# Patient Record
Sex: MALE | Race: WHITE | NOT HISPANIC OR LATINO | Employment: FULL TIME | URBAN - NONMETROPOLITAN AREA
[De-identification: names, ages, dates, MRNs, and addresses within clinical notes are randomized per-mention and may not be internally consistent; named-entity substitution may affect disease eponyms.]

---

## 2021-10-28 ENCOUNTER — HISTORICAL (OUTPATIENT)
Dept: ADMINISTRATIVE | Facility: HOSPITAL | Age: 46
End: 2021-10-28

## 2021-10-28 LAB
ALBUMIN SERPL-MCNC: 4.6 G/DL (ref 3.4–5)
ALBUMIN/GLOB SERPL: 1.6 {RATIO}
ALP SERPL-CCNC: 89 U/L (ref 50–144)
ALT SERPL-CCNC: 16 U/L (ref 1–45)
ANION GAP SERPL CALC-SCNC: 5 MMOL/L (ref 7–16)
AST SERPL-CCNC: 25 U/L (ref 17–59)
BILIRUB SERPL-MCNC: 0.88 MG/DL (ref 0–1)
BUN SERPL-MCNC: 11 MG/DL (ref 7–20)
CALCIUM SERPL-MCNC: 9.5 MG/DL (ref 8.4–10.2)
CHLORIDE SERPL-SCNC: 103 MMOL/L (ref 94–110)
CO2 SERPL-SCNC: 27 MMOL/L (ref 21–32)
CREAT SERPL-MCNC: 0.7 MG/DL (ref 0.66–1.25)
CREAT/UREA NIT SERPL: 15.7 (ref 12–20)
GLOBULIN SER-MCNC: 2.8 G/DL (ref 2–3.9)
GLUCOSE SERPL-MCNC: 127 MGM./DL (ref 70–115)
POTASSIUM SERPL-SCNC: 3.9 MMOL/L (ref 3.5–5.1)
PROT SERPL-MCNC: 7.4 G/DL (ref 6.3–8.2)
SODIUM SERPL-SCNC: 135 MMOL/L (ref 135–145)

## 2021-10-30 LAB
BASOPHILS # BLD AUTO: 0.04 10*3/UL (ref 0.01–0.08)
BASOPHILS NFR BLD AUTO: 0.4 % (ref 0.1–1.2)
EOSINOPHIL # BLD AUTO: 0.13 10*3/UL (ref 0.04–0.54)
EOSINOPHIL NFR BLD AUTO: 1.2 % (ref 0.7–7)
ERYTHROCYTE [DISTWIDTH] IN BLOOD BY AUTOMATED COUNT: 12.4 % (ref 11.6–14.4)
HCT VFR BLD AUTO: 34.9 % (ref 36–52)
HGB BLD-MCNC: 11.6 G/DL (ref 13–18)
IMM GRANULOCYTES # BLD AUTO: 0.03 10*3/UL (ref 0–0.03)
IMM GRANULOCYTES NFR BLD AUTO: 0.3 % (ref 0–0.5)
LYMPHOCYTES # BLD AUTO: 1.44 10*3/UL (ref 1.32–3.57)
LYMPHOCYTES NFR BLD AUTO: 13.8 % (ref 20–55)
MCH RBC QN AUTO: 30.7 PG (ref 27–34)
MCHC RBC AUTO-ENTMCNC: 33.2 G/DL (ref 31–37)
MCV RBC AUTO: 92.3 FL (ref 79–99)
MONOCYTES # BLD AUTO: 0.89 10*3/UL (ref 0.3–0.82)
MONOCYTES NFR BLD AUTO: 8.5 % (ref 4.7–12.5)
NEUTROPHILS # BLD AUTO: 7.9 10*3/UL (ref 1.78–5.38)
NEUTROPHILS NFR BLD AUTO: 75.8 % (ref 37–73)
PLATELET # BLD AUTO: 284 10*3/UL (ref 140–371)
PMV BLD AUTO: 10.2 FL (ref 9.4–12.4)
RBC # BLD AUTO: 3.78 10*6/UL (ref 4–6)
WBC # SPEC AUTO: 10.4 10*3/UL (ref 4–11.5)

## 2021-11-19 LAB
ALBUMIN SERPL-MCNC: 4.2 G/DL (ref 3.4–5)
ALBUMIN/GLOB SERPL: 1.5 {RATIO}
ALP SERPL-CCNC: 74 U/L (ref 50–144)
ALT SERPL-CCNC: 20 U/L (ref 1–45)
ANION GAP SERPL CALC-SCNC: 5 MMOL/L (ref 7–16)
AST SERPL-CCNC: 24 U/L (ref 17–59)
BASOPHILS # BLD AUTO: 0.04 10*3/UL (ref 0.01–0.08)
BASOPHILS NFR BLD AUTO: 0.7 % (ref 0.1–1.2)
BILIRUB SERPL-MCNC: 0.49 MG/DL (ref 0–1)
BUN SERPL-MCNC: 20 MG/DL (ref 7–20)
CALCIUM SERPL-MCNC: 9.3 MG/DL (ref 8.4–10.2)
CHLORIDE SERPL-SCNC: 105 MMOL/L (ref 94–110)
CO2 SERPL-SCNC: 29 MMOL/L (ref 21–32)
CREAT SERPL-MCNC: 1 MG/DL (ref 0.66–1.25)
CREAT/UREA NIT SERPL: 20 (ref 12–20)
CRP SERPL-MCNC: <0.5 MG/DL (CALC) (ref 0–0.9)
EOSINOPHIL # BLD AUTO: 0.2 10*3/UL (ref 0.04–0.54)
EOSINOPHIL NFR BLD AUTO: 3.7 % (ref 0.7–7)
ERYTHROCYTE [DISTWIDTH] IN BLOOD BY AUTOMATED COUNT: 12.6 % (ref 11.6–14.4)
ERYTHROCYTE [SEDIMENTATION RATE] IN BLOOD: 12 MM/HR (ref 0–10)
GLOBULIN SER-MCNC: 2.8 G/DL (ref 2–3.9)
GLUCOSE SERPL-MCNC: 70 MGM./DL (ref 70–115)
HCT VFR BLD AUTO: 38.3 % (ref 36–52)
HGB BLD-MCNC: 12.6 G/DL (ref 13–18)
IMM GRANULOCYTES # BLD AUTO: 0.01 10*3/UL (ref 0–0.03)
IMM GRANULOCYTES NFR BLD AUTO: 0.2 % (ref 0–0.5)
LYMPHOCYTES # BLD AUTO: 1.33 10*3/UL (ref 1.32–3.57)
LYMPHOCYTES NFR BLD AUTO: 24.6 % (ref 20–55)
MCH RBC QN AUTO: 30.1 PG (ref 27–34)
MCHC RBC AUTO-ENTMCNC: 32.9 G/DL (ref 31–37)
MCV RBC AUTO: 91.6 FL (ref 79–99)
MONOCYTES # BLD AUTO: 0.37 10*3/UL (ref 0.3–0.82)
MONOCYTES NFR BLD AUTO: 6.8 % (ref 4.7–12.5)
NEUTROPHILS # BLD AUTO: 3.46 10*3/UL (ref 1.78–5.38)
NEUTROPHILS NFR BLD AUTO: 64 % (ref 37–73)
PLATELET # BLD AUTO: 413 10*3/UL (ref 140–371)
PMV BLD AUTO: 10.8 FL (ref 9.4–12.4)
POTASSIUM SERPL-SCNC: 4.2 MMOL/L (ref 3.5–5.1)
PROT SERPL-MCNC: 7 G/DL (ref 6.3–8.2)
RBC # BLD AUTO: 4.18 10*6/UL (ref 4–6)
SODIUM SERPL-SCNC: 139 MMOL/L (ref 135–145)
WBC # SPEC AUTO: 5.4 10*3/UL (ref 4–11.5)

## 2021-12-17 ENCOUNTER — HISTORICAL (OUTPATIENT)
Dept: ADMINISTRATIVE | Facility: HOSPITAL | Age: 46
End: 2021-12-17

## 2021-12-17 LAB
BASOPHILS # BLD AUTO: 0.03 10*3/UL (ref 0.01–0.08)
BASOPHILS NFR BLD AUTO: 0.4 % (ref 0.1–1.2)
CRP SERPL-MCNC: <0.5 MG/DL (CALC) (ref 0–0.9)
EOSINOPHIL # BLD AUTO: 0.12 10*3/UL (ref 0.04–0.54)
EOSINOPHIL NFR BLD AUTO: 1.5 % (ref 0.7–7)
ERYTHROCYTE [DISTWIDTH] IN BLOOD BY AUTOMATED COUNT: 13 % (ref 11.6–14.4)
ERYTHROCYTE [SEDIMENTATION RATE] IN BLOOD: 2 MM/HR (ref 0–10)
HCT VFR BLD AUTO: 39.1 % (ref 36–52)
HGB BLD-MCNC: 12.8 G/DL (ref 13–18)
IMM GRANULOCYTES # BLD AUTO: 0.04 10*3/UL (ref 0–0.03)
IMM GRANULOCYTES NFR BLD AUTO: 0.5 % (ref 0–0.5)
LYMPHOCYTES # BLD AUTO: 1.93 10*3/UL (ref 1.32–3.57)
LYMPHOCYTES NFR BLD AUTO: 24.4 % (ref 20–55)
MCH RBC QN AUTO: 30.1 PG (ref 27–34)
MCHC RBC AUTO-ENTMCNC: 32.7 G/DL (ref 31–37)
MCV RBC AUTO: 92 FL (ref 79–99)
MONOCYTES # BLD AUTO: 0.57 10*3/UL (ref 0.3–0.82)
MONOCYTES NFR BLD AUTO: 7.2 % (ref 4.7–12.5)
NEUTROPHILS # BLD AUTO: 5.23 10*3/UL (ref 1.78–5.38)
NEUTROPHILS NFR BLD AUTO: 66 % (ref 37–73)
PLATELET # BLD AUTO: 332 10*3/UL (ref 140–371)
PMV BLD AUTO: 10.6 FL (ref 9.4–12.4)
RBC # BLD AUTO: 4.25 10*6/UL (ref 4–6)
WBC # SPEC AUTO: 7.9 10*3/UL (ref 4–11.5)

## 2022-04-10 ENCOUNTER — HISTORICAL (OUTPATIENT)
Dept: ADMINISTRATIVE | Facility: HOSPITAL | Age: 47
End: 2022-04-10

## 2022-04-11 ENCOUNTER — HISTORICAL (OUTPATIENT)
Dept: ADMINISTRATIVE | Facility: HOSPITAL | Age: 47
End: 2022-04-11

## 2022-04-28 VITALS
BODY MASS INDEX: 23.01 KG/M2 | DIASTOLIC BLOOD PRESSURE: 58 MMHG | SYSTOLIC BLOOD PRESSURE: 105 MMHG | HEIGHT: 67 IN | WEIGHT: 146.63 LBS | OXYGEN SATURATION: 98 %

## 2022-04-28 VITALS
BODY MASS INDEX: 23.01 KG/M2 | OXYGEN SATURATION: 98 % | WEIGHT: 146.63 LBS | SYSTOLIC BLOOD PRESSURE: 105 MMHG | HEIGHT: 67 IN | DIASTOLIC BLOOD PRESSURE: 58 MMHG

## 2022-05-04 ENCOUNTER — HISTORICAL (OUTPATIENT)
Dept: ADMINISTRATIVE | Facility: HOSPITAL | Age: 47
End: 2022-05-04

## 2023-02-08 ENCOUNTER — LAB VISIT (OUTPATIENT)
Dept: LAB | Facility: HOSPITAL | Age: 48
End: 2023-02-08
Attending: NURSE PRACTITIONER
Payer: MEDICAID

## 2023-02-08 DIAGNOSIS — Z79.899 ENCOUNTER FOR LONG-TERM (CURRENT) USE OF OTHER MEDICATIONS: Primary | ICD-10-CM

## 2023-02-08 LAB
ALBUMIN SERPL-MCNC: 4.5 G/DL (ref 3.4–5)
ALBUMIN/GLOB SERPL: 1.8 RATIO
ALP SERPL-CCNC: 75 UNIT/L (ref 50–144)
ALT SERPL-CCNC: 18 UNIT/L (ref 1–45)
ANION GAP SERPL CALC-SCNC: 6 MEQ/L (ref 2–13)
AST SERPL-CCNC: 27 UNIT/L (ref 17–59)
BASOPHILS # BLD AUTO: 0.03 X10(3)/MCL (ref 0.01–0.08)
BASOPHILS NFR BLD AUTO: 0.4 % (ref 0.1–1.2)
BILIRUBIN DIRECT+TOT PNL SERPL-MCNC: 0.6 MG/DL (ref 0–1)
BUN SERPL-MCNC: 21 MG/DL (ref 7–20)
CALCIUM SERPL-MCNC: 9.6 MG/DL (ref 8.4–10.2)
CHLORIDE SERPL-SCNC: 103 MMOL/L (ref 98–110)
CHOLEST SERPL-MCNC: 189 MG/DL (ref 0–200)
CO2 SERPL-SCNC: 30 MMOL/L (ref 21–32)
CREAT SERPL-MCNC: 0.83 MG/DL (ref 0.66–1.25)
CREAT/UREA NIT SERPL: 25 (ref 12–20)
DEPRECATED CALCIDIOL+CALCIFEROL SERPL-MC: 28.1 NG/ML (ref 30–100)
EOSINOPHIL # BLD AUTO: 0.2 X10(3)/MCL (ref 0.04–0.54)
EOSINOPHIL NFR BLD AUTO: 2.5 % (ref 0.7–7)
ERYTHROCYTE [DISTWIDTH] IN BLOOD BY AUTOMATED COUNT: 12.3 % (ref 11.6–14.4)
GFR SERPLBLD CREATININE-BSD FMLA CKD-EPI: >90 MLS/MIN/1.73/M2
GLOBULIN SER-MCNC: 2.5 GM/DL (ref 2–3.9)
GLUCOSE SERPL-MCNC: 109 MG/DL (ref 70–115)
HCT VFR BLD AUTO: 40.7 % (ref 36–52)
HDLC SERPL-MCNC: 53 MG/DL (ref 40–60)
HGB BLD-MCNC: 13.8 GM/DL (ref 13–18)
IMM GRANULOCYTES # BLD AUTO: 0.04 X10(3)/MCL (ref 0–0.03)
IMM GRANULOCYTES NFR BLD AUTO: 0.5 % (ref 0–0.5)
LDLC SERPL DIRECT ASSAY-SCNC: 105.1 MG/DL (ref 30–100)
LYMPHOCYTES # BLD AUTO: 1.83 X10(3)/MCL (ref 1.32–3.57)
LYMPHOCYTES NFR BLD AUTO: 22.6 % (ref 20–55)
MAGNESIUM SERPL-MCNC: 2.1 MG/DL (ref 1.8–2.4)
MCH RBC QN AUTO: 31.7 PG (ref 27–34)
MCV RBC AUTO: 93.6 FL (ref 79–99)
MEAN CELL HEMOGLOBIN CONCENTRATION (OHS) G/DL: 33.9 G/DL (ref 31–37)
MONOCYTES # BLD AUTO: 0.5 X10(3)/MCL (ref 0.3–0.82)
MONOCYTES NFR BLD AUTO: 6.2 % (ref 4.7–12.5)
NEUTROPHILS # BLD AUTO: 5.49 X10(3)/MCL (ref 1.78–5.38)
NEUTROPHILS NFR BLD AUTO: 67.8 % (ref 37–73)
NRBC BLD AUTO-RTO: 0 % (ref 0–1)
PLATELET # BLD AUTO: 276 X10(3)/MCL (ref 140–371)
PMV BLD AUTO: 10.3 FL (ref 9.4–12.4)
POTASSIUM SERPL-SCNC: 4.3 MMOL/L (ref 3.5–5.1)
PROT SERPL-MCNC: 7 GM/DL (ref 6.3–8.2)
RBC # BLD AUTO: 4.35 X10(6)/MCL (ref 4–6)
SODIUM SERPL-SCNC: 139 MMOL/L (ref 135–145)
TRIGL SERPL-MCNC: 126 MG/DL (ref 30–200)
TSH SERPL-ACNC: 2.09 UIU/ML (ref 0.36–3.74)
WBC # SPEC AUTO: 8.1 X10(3)/MCL (ref 4–11.5)

## 2023-02-08 PROCEDURE — 85025 COMPLETE CBC W/AUTO DIFF WBC: CPT

## 2023-02-08 PROCEDURE — 36415 COLL VENOUS BLD VENIPUNCTURE: CPT

## 2023-02-08 PROCEDURE — 83735 ASSAY OF MAGNESIUM: CPT

## 2023-02-08 PROCEDURE — 80053 COMPREHEN METABOLIC PANEL: CPT

## 2023-02-08 PROCEDURE — 82306 VITAMIN D 25 HYDROXY: CPT

## 2023-02-08 PROCEDURE — 84443 ASSAY THYROID STIM HORMONE: CPT

## 2023-02-08 PROCEDURE — 80061 LIPID PANEL: CPT

## 2023-02-08 PROCEDURE — 84146 ASSAY OF PROLACTIN: CPT

## 2023-02-09 LAB — PROLACTIN LEVEL (OHS): 45.3 NG/ML (ref 3.46–19.4)

## 2023-12-05 ENCOUNTER — HOSPITAL ENCOUNTER (EMERGENCY)
Facility: HOSPITAL | Age: 48
Discharge: HOME | End: 2023-12-05
Payer: MEDICAID

## 2023-12-05 VITALS
RESPIRATION RATE: 18 BRPM | TEMPERATURE: 98 F | SYSTOLIC BLOOD PRESSURE: 107 MMHG | HEIGHT: 67 IN | DIASTOLIC BLOOD PRESSURE: 71 MMHG | OXYGEN SATURATION: 98 % | HEART RATE: 75 BPM | BODY MASS INDEX: 27.47 KG/M2 | WEIGHT: 175 LBS

## 2023-12-05 DIAGNOSIS — L60.0 INGROWN RIGHT BIG TOENAIL: Primary | ICD-10-CM

## 2023-12-05 PROCEDURE — 99282 EMERGENCY DEPT VISIT SF MDM: CPT

## 2023-12-05 RX ORDER — CLOTRIMAZOLE 1 %
CREAM (GRAM) TOPICAL 2 TIMES DAILY
Qty: 28 G | Refills: 0 | Status: SHIPPED | OUTPATIENT
Start: 2023-12-05 | End: 2023-12-19

## 2023-12-05 NOTE — ED PROVIDER NOTES
Encounter Date: 12/5/2023       History     Chief Complaint   Patient presents with    Ingrown Toenail     C/o ingrown toenail to right big toe x 1 month would like removed      C/o ingrown toenail to right big toe x 1 month would like removed      Review of patient's allergies indicates:  No Known Allergies  History reviewed. No pertinent past medical history.  History reviewed. No pertinent surgical history.  History reviewed. No pertinent family history.  Social History     Tobacco Use    Smoking status: Every Day     Types: Cigarettes    Smokeless tobacco: Never   Substance Use Topics    Alcohol use: Not Currently    Drug use: Never     Review of Systems   Musculoskeletal:         Rt big toe pain, red and swollen   All other systems reviewed and are negative.      Physical Exam     Initial Vitals [12/05/23 1421]   BP Pulse Resp Temp SpO2   120/82 88 20 98.2 °F (36.8 °C) 99 %      MAP       --         Physical Exam    Nursing note and vitals reviewed.  Constitutional: He appears well-developed and well-nourished.   HENT:   Head: Normocephalic and atraumatic.   Mouth/Throat: Mucous membranes are normal.   Eyes: EOM are normal. Pupils are equal, round, and reactive to light.   Neck: Neck supple.   Normal range of motion.  Cardiovascular:  Normal rate, regular rhythm and normal heart sounds.           Pulmonary/Chest: Breath sounds normal.   Musculoskeletal:         General: Normal range of motion.      Cervical back: Normal range of motion and neck supple.      Comments: Rt big toe yellow thick skin lateral aspect, no drainage, mild swelling and erythema     Neurological: He is alert and oriented to person, place, and time.   Skin: Skin is warm and dry. Capillary refill takes less than 2 seconds.   Psychiatric: He has a normal mood and affect. His behavior is normal. Judgment and thought content normal.         ED Course   Procedures  Labs Reviewed - No data to display       Imaging Results    None           Medications - No data to display  Medical Decision Making  Problems Addressed:  Ingrown right big toenail: chronic illness or injury                                      Clinical Impression:  Final diagnoses:  [L60.0] Ingrown right big toenail (Primary)          ED Disposition Condition    Discharge Stable          ED Prescriptions       Medication Sig Dispense Start Date End Date Auth. Provider    clotrimazole (LOTRIMIN) 1 % cream Apply topically 2 (two) times daily. for 14 days 28 g 12/5/2023 12/19/2023 Emily Forman FNP          Follow-up Information       Follow up With Specialties Details Why Contact Info    Rosy French, ANDREA Nurse Practitioner Call  As needed [address]  [place] [address]  [phone]               Emily Forman FNP  12/05/23 [id number]

## 2024-06-10 ENCOUNTER — LAB VISIT (OUTPATIENT)
Dept: LAB | Facility: HOSPITAL | Age: 49
End: 2024-06-10
Attending: NURSE PRACTITIONER
Payer: MEDICAID

## 2024-06-10 DIAGNOSIS — Z79.899 ENCOUNTER FOR LONG-TERM (CURRENT) USE OF OTHER MEDICATIONS: Primary | ICD-10-CM

## 2024-06-10 LAB
25(OH)D3+25(OH)D2 SERPL-MCNC: 37 NG/ML (ref 30–80)
ALBUMIN SERPL-MCNC: 4.1 G/DL (ref 3.4–5)
ALBUMIN/GLOB SERPL: 1.6 RATIO
ALP SERPL-CCNC: 74 UNIT/L (ref 50–144)
ALT SERPL-CCNC: 15 UNIT/L (ref 1–45)
ANION GAP SERPL CALC-SCNC: 7 MEQ/L (ref 2–13)
AST SERPL-CCNC: 22 UNIT/L (ref 17–59)
BASOPHILS # BLD AUTO: 0.02 X10(3)/MCL (ref 0.01–0.08)
BASOPHILS NFR BLD AUTO: 0.4 % (ref 0.1–1.2)
BILIRUB SERPL-MCNC: 0.5 MG/DL (ref 0–1)
BUN SERPL-MCNC: 18 MG/DL (ref 7–20)
CALCIUM SERPL-MCNC: 9.2 MG/DL (ref 8.4–10.2)
CHLORIDE SERPL-SCNC: 104 MMOL/L (ref 98–110)
CHOLEST SERPL-MCNC: 193 MG/DL (ref 0–200)
CO2 SERPL-SCNC: 26 MMOL/L (ref 21–32)
CREAT SERPL-MCNC: 0.92 MG/DL (ref 0.66–1.25)
CREAT/UREA NIT SERPL: 20 (ref 12–20)
EOSINOPHIL # BLD AUTO: 0.18 X10(3)/MCL (ref 0.04–0.54)
EOSINOPHIL NFR BLD AUTO: 3.4 % (ref 0.7–7)
ERYTHROCYTE [DISTWIDTH] IN BLOOD BY AUTOMATED COUNT: 12.2 %
GFR SERPLBLD CREATININE-BSD FMLA CKD-EPI: >90 ML/MIN/1.73/M2
GLOBULIN SER-MCNC: 2.6 GM/DL (ref 2–3.9)
GLUCOSE SERPL-MCNC: 143 MG/DL (ref 70–115)
HCT VFR BLD AUTO: 39 % (ref 36–52)
HDLC SERPL-MCNC: 49 MG/DL (ref 40–60)
HGB BLD-MCNC: 13.4 G/DL (ref 13–18)
IMM GRANULOCYTES # BLD AUTO: 0.02 X10(3)/MCL (ref 0–0.03)
IMM GRANULOCYTES NFR BLD AUTO: 0.4 % (ref 0–0.5)
LDLC SERPL DIRECT ASSAY-SCNC: 119.9 MG/DL (ref 30–100)
LYMPHOCYTES # BLD AUTO: 1.82 X10(3)/MCL (ref 1.32–3.57)
LYMPHOCYTES NFR BLD AUTO: 34 % (ref 20–55)
MAGNESIUM SERPL-MCNC: 2.2 MG/DL (ref 1.8–2.4)
MCH RBC QN AUTO: 31.6 PG (ref 27–34)
MCHC RBC AUTO-ENTMCNC: 34.4 G/DL (ref 31–37)
MCV RBC AUTO: 92 FL (ref 79–99)
MONOCYTES # BLD AUTO: 0.35 X10(3)/MCL (ref 0.3–0.82)
MONOCYTES NFR BLD AUTO: 6.5 % (ref 4.7–12.5)
NEUTROPHILS # BLD AUTO: 2.96 X10(3)/MCL (ref 1.78–5.38)
NEUTROPHILS NFR BLD AUTO: 55.3 % (ref 37–73)
NRBC BLD AUTO-RTO: 0 %
PLATELET # BLD AUTO: 279 X10(3)/MCL (ref 140–371)
PMV BLD AUTO: 10.3 FL (ref 9.4–12.4)
POTASSIUM SERPL-SCNC: 4 MMOL/L (ref 3.5–5.1)
PROLACTIN LEVEL (OLG): 45.12 NG/ML (ref 3.46–19.4)
PROT SERPL-MCNC: 6.7 GM/DL (ref 6.3–8.2)
RBC # BLD AUTO: 4.24 X10(6)/MCL (ref 4–6)
SODIUM SERPL-SCNC: 137 MMOL/L (ref 136–145)
TRIGL SERPL-MCNC: 67 MG/DL (ref 30–200)
TSH SERPL-ACNC: 2.53 UIU/ML (ref 0.36–3.74)
WBC # SPEC AUTO: 5.35 X10(3)/MCL (ref 4–11.5)

## 2024-06-10 PROCEDURE — 84146 ASSAY OF PROLACTIN: CPT

## 2024-06-10 PROCEDURE — 80053 COMPREHEN METABOLIC PANEL: CPT

## 2024-06-10 PROCEDURE — 80061 LIPID PANEL: CPT

## 2024-06-10 PROCEDURE — 82306 VITAMIN D 25 HYDROXY: CPT

## 2024-06-10 PROCEDURE — 36415 COLL VENOUS BLD VENIPUNCTURE: CPT

## 2024-06-10 PROCEDURE — 83735 ASSAY OF MAGNESIUM: CPT

## 2024-06-10 PROCEDURE — 85025 COMPLETE CBC W/AUTO DIFF WBC: CPT

## 2024-06-10 PROCEDURE — 84443 ASSAY THYROID STIM HORMONE: CPT

## 2025-08-24 ENCOUNTER — HOSPITAL ENCOUNTER (EMERGENCY)
Facility: HOSPITAL | Age: 50
Discharge: HOME | End: 2025-08-24
Payer: COMMERCIAL

## 2025-08-24 VITALS
TEMPERATURE: 98 F | BODY MASS INDEX: 25.11 KG/M2 | HEART RATE: 101 BPM | HEIGHT: 67 IN | DIASTOLIC BLOOD PRESSURE: 79 MMHG | RESPIRATION RATE: 18 BRPM | OXYGEN SATURATION: 98 % | WEIGHT: 160 LBS | SYSTOLIC BLOOD PRESSURE: 126 MMHG

## 2025-08-24 DIAGNOSIS — S51.051A: Primary | ICD-10-CM
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
## 2025-08-24 PROCEDURE — 12002 RPR S/N/AX/GEN/TRNK2.6-7.5CM: CPT

## 2025-08-24 PROCEDURE — 63600175 PHARM REV CODE 636 W HCPCS: Performed by: NURSE PRACTITIONER

## 2025-08-24 PROCEDURE — 12032 INTMD RPR S/A/T/EXT 2.6-7.5: CPT

## 2025-08-24 PROCEDURE — 25000003 PHARM REV CODE 250: Performed by: NURSE PRACTITIONER

## 2025-08-24 PROCEDURE — 99283 EMERGENCY DEPT VISIT LOW MDM: CPT | Mod: 25

## 2025-08-24 RX ORDER — AMOXICILLIN AND CLAVULANATE POTASSIUM 875; 125 MG/1; MG/1
1 TABLET, FILM COATED ORAL 2 TIMES DAILY
Qty: 14 TABLET | Refills: 0 | Status: SHIPPED | OUTPATIENT
Start: 2025-08-24 | End: 2025-08-31

## 2025-08-24 RX ORDER — LIDOCAINE HYDROCHLORIDE 10 MG/ML
1 INJECTION, SOLUTION INFILTRATION; PERINEURAL ONCE
Status: COMPLETED | OUTPATIENT
Start: 2025-08-24 | End: 2025-08-24

## 2025-08-24 RX ADMIN — LIDOCAINE HYDROCHLORIDE 1 ML: 10 INJECTION, SOLUTION INFILTRATION; PERINEURAL at 06:08

## 2025-08-24 RX ADMIN — BACITRACIN ZINC, NEOMYCIN, POLYMYXIN B SULFAT 1 EACH: 5000; 3.5; 4 OINTMENT TOPICAL at 06:08

## 2025-09-02 PROBLEM — Z48.02 VISIT FOR SUTURE REMOVAL: Status: ACTIVE | Noted: 2025-09-02
